# Patient Record
Sex: FEMALE | Race: WHITE | NOT HISPANIC OR LATINO | Employment: UNEMPLOYED | URBAN - METROPOLITAN AREA
[De-identification: names, ages, dates, MRNs, and addresses within clinical notes are randomized per-mention and may not be internally consistent; named-entity substitution may affect disease eponyms.]

---

## 2021-03-15 ENCOUNTER — CONVERSION ENCOUNTER (OUTPATIENT)
Dept: CARDIOLOGY | Facility: CLINIC | Age: 61
End: 2021-03-15

## 2021-03-15 ENCOUNTER — OFFICE VISIT CONVERTED (OUTPATIENT)
Dept: CARDIOLOGY | Facility: CLINIC | Age: 61
End: 2021-03-15
Attending: INTERNAL MEDICINE

## 2021-03-16 ENCOUNTER — HOSPITAL ENCOUNTER (OUTPATIENT)
Dept: LAB | Facility: HOSPITAL | Age: 61
Discharge: HOME | End: 2021-03-16
Attending: INTERNAL MEDICINE

## 2021-03-16 LAB
ALBUMIN SERPL-MCNC: 4.1 G/DL (ref 3.5–5)
ALBUMIN/GLOB SERPL: 1.6 {RATIO} (ref 1.4–2.6)
ALP SERPL-CCNC: 90 U/L (ref 43–160)
ALT SERPL-CCNC: 36 U/L (ref 10–40)
ANION GAP SERPL CALC-SCNC: 12 MMOL/L (ref 8–19)
AST SERPL-CCNC: 30 U/L (ref 15–50)
BASOPHILS # BLD AUTO: 0.04 10*3/UL (ref 0–0.2)
BASOPHILS NFR BLD AUTO: 0.8 % (ref 0–3)
BILIRUB SERPL-MCNC: 0.44 MG/DL (ref 0.2–1.3)
BNP SERPL-MCNC: 88 PG/ML (ref 0–900)
BUN SERPL-MCNC: 18 MG/DL (ref 5–25)
BUN/CREAT SERPL: 18 {RATIO} (ref 6–20)
CALCIUM SERPL-MCNC: 9.6 MG/DL (ref 8.7–10.4)
CHLORIDE SERPL-SCNC: 106 MMOL/L (ref 99–111)
CHOLEST SERPL-MCNC: 126 MG/DL (ref 107–200)
CHOLEST/HDLC SERPL: 1.7 {RATIO} (ref 3–6)
CONV ABS IMM GRAN: 0.01 10*3/UL (ref 0–0.2)
CONV CO2: 29 MMOL/L (ref 22–32)
CONV IMMATURE GRAN: 0.2 % (ref 0–1.8)
CONV TOTAL PROTEIN: 6.6 G/DL (ref 6.3–8.2)
CREAT UR-MCNC: 0.99 MG/DL (ref 0.5–0.9)
DEPRECATED RDW RBC AUTO: 47.2 FL (ref 36.4–46.3)
EOSINOPHIL # BLD AUTO: 0.08 10*3/UL (ref 0–0.7)
EOSINOPHIL # BLD AUTO: 1.6 % (ref 0–7)
ERYTHROCYTE [DISTWIDTH] IN BLOOD BY AUTOMATED COUNT: 13.1 % (ref 11.7–14.4)
GFR SERPLBLD BASED ON 1.73 SQ M-ARVRAT: >60 ML/MIN/{1.73_M2}
GLOBULIN UR ELPH-MCNC: 2.5 G/DL (ref 2–3.5)
GLUCOSE SERPL-MCNC: 85 MG/DL (ref 65–99)
HCT VFR BLD AUTO: 38.7 % (ref 37–47)
HDLC SERPL-MCNC: 73 MG/DL (ref 40–60)
HGB BLD-MCNC: 12.4 G/DL (ref 12–16)
LDLC SERPL CALC-MCNC: 42 MG/DL (ref 70–100)
LYMPHOCYTES # BLD AUTO: 1.79 10*3/UL (ref 1–5)
LYMPHOCYTES NFR BLD AUTO: 35.4 % (ref 20–45)
MAGNESIUM SERPL-MCNC: 2.47 MG/DL (ref 1.6–2.3)
MCH RBC QN AUTO: 31 PG (ref 27–31)
MCHC RBC AUTO-ENTMCNC: 32 G/DL (ref 33–37)
MCV RBC AUTO: 96.8 FL (ref 81–99)
MONOCYTES # BLD AUTO: 0.47 10*3/UL (ref 0.2–1.2)
MONOCYTES NFR BLD AUTO: 9.3 % (ref 3–10)
NEUTROPHILS # BLD AUTO: 2.67 10*3/UL (ref 2–8)
NEUTROPHILS NFR BLD AUTO: 52.7 % (ref 30–85)
NRBC CBCN: 0 % (ref 0–0.7)
OSMOLALITY SERPL CALC.SUM OF ELEC: 297 MOSM/KG (ref 273–304)
PLATELET # BLD AUTO: 222 10*3/UL (ref 130–400)
PMV BLD AUTO: 10.9 FL (ref 9.4–12.3)
POTASSIUM SERPL-SCNC: 4.1 MMOL/L (ref 3.5–5.3)
RBC # BLD AUTO: 4 10*6/UL (ref 4.2–5.4)
SODIUM SERPL-SCNC: 143 MMOL/L (ref 135–147)
T4 FREE SERPL-MCNC: 1.2 NG/DL (ref 0.9–1.8)
TRIGL SERPL-MCNC: 53 MG/DL (ref 40–150)
TSH SERPL-ACNC: 6.41 M[IU]/L (ref 0.27–4.2)
VLDLC SERPL-MCNC: 11 MG/DL (ref 5–37)
WBC # BLD AUTO: 5.06 10*3/UL (ref 4.8–10.8)

## 2021-03-26 ENCOUNTER — CONVERSION ENCOUNTER (OUTPATIENT)
Dept: CARDIOLOGY | Facility: CLINIC | Age: 61
End: 2021-03-26
Attending: INTERNAL MEDICINE

## 2021-05-10 NOTE — H&P
History and Physical      Patient Name: Sierra Ramirez   Patient ID: [id number]   Sex: Female   YOB: 1960    Primary Care Provider: Shaji Collins MD   Referring Provider: Shaji Collins MD    Visit Date: March 15, 2021    Provider: Dontrell Robbins MD   Location: [facility]   Location Address: [address] [place] [address], [facility]   [place], [place]  [id number]   Location Phone: [phone]          Chief Complaint     REASON FOR CONSULTATION: Cardiomyopathy, atrial fibrillation, establish cardiac care.       History Of Present Illness  Consult requested by: Shaji Collins MD   Sierra Ramirez is a 60 year old  female with dilated cardiomyopathy, status post ICD, history of paroxysmal atrial fibrillation, and ventricular tachycardia on amiodarone who is here to establish cardiac care. The patient moved from [place] to [place] 6 months back. She was diagnosed with congestive heart failure and cardiomyopathy around 10 years back. There were 2 cardiac catheterizations done since then. The last one was in 2019. She is on appropriate medical therapy since then. She had an ICD shock 2 years back, for which another cardiac cath was performed. She is on amiodarone since then. Other medications include Entresto, spironolactone, and torsemide. Today she denies having any chest pain, tightness, or pressure. She has shortness of breath on moderate exertion, which has been stable for the past several months. No recent ICD shocks. She currently has no pedal edema. There are no symptoms suggestive of orthopnea or PND. No dizziness.   PAST MEDICAL HISTORY: (1) Dilated cardiomyopathy with LV ejection fraction 15-20% per left ventriculogram in 2019. (2) Reported ventricular arrhythmias, on long-term amiodarone. (3) Paroxysmal atrial fibrillation, on anticoagulation with Xarelto. (4) Hypertension. (5) Hyperlipidemia. (6) Gastroesophageal reflux disease.   PSYCHOSOCIAL HISTORY: Denies tobacco use.  "Drinks alcohol rarely. No recreational drug usage.   FAMILY HISTORY: Positive for diabetes, hypertension, and heart disease. No family history of premature coronary artery disease.   CURRENT MEDICATIONS: Xarelto 20 mg daily; Torsemide 20 mg daily; Amiodarone  mg daily; Spironolactone 25 mg daily; Entresto 49/51 mg b.i.d.; Metoprolol Succinate ER 25 mg daily; Atorvastatin 40 mg daily; Esomeprazole 20 mg daily p.r.n.; Amitriptyline 25 mg p.r.n.; Nexium 20 mg p.r.n.   ALLERGIES: No known drug allergies.       Review of Systems  · Constitutional  o Admits  o : fatigue, good general health lately, recent weight changes   · Eyes  o Admits  o : double vision  · HENT  o Admits  o : hearing loss or ringing, swollen glands in neck  o Denies  o : chronic sinus problem  · Cardiovascular  o Admits  o : chest pain, palpitations (fast, fluttering, or skipping beats), swelling (feet, ankles, hands), shortness of breath while walking or lying flat  · Respiratory  o Admits  o : chronic or frequent cough  o Denies  o : asthma or wheezing, COPD  · Gastrointestinal  o Admits  o : ulcers  o Denies  o : nausea or vomiting  · Neurologic  o Admits  o : lightheaded or dizzy, headaches  o Denies  o : stroke  · Musculoskeletal  o Admits  o : joint pain, back pain  · Endocrine  o Admits  o : heat or cold intolerance, excessive thirst or urination  o Denies  o : thyroid disease, diabetes  · Heme-Lymph  o Admits  o : bleeding or bruising tendency  o Denies  o : anemia      Vitals  Date Time BP Position Site L\R Cuff Size HR RR TEMP (F) WT  HT  BMI kg/m2 BSA m2 O2 Sat FR L/min FiO2 HC       03/15/2021 02:08 PM 80/46 Sitting    60 - R   118lbs 0oz 5'  3\" 20.9 1.54             Physical Examination  · Constitutional  o Appearance  o : Awake, alert, in no acute distress.  · Head and Face  o HEENT  o : No pallor, anicteric. Eyes normal. Moist mucous membranes.  · Neck  o Inspection/Palpation  o : Supple.   o Jugular Veins  o : No JVD. No carotid " bruits.  · Respiratory  o Auscultation of Lungs  o : Clear to auscultation bilaterally. No crackles or wheezing.  · Cardiovascular  o Heart  o : S1, S2 is normally heard. No S3. No murmur, rubs, or gallops.  · Gastrointestinal  o Abdominal Examination  o : Soft, non-distended. No palpable hepatosplenomegaly. Bowel sounds heard in all four quadrants.  · Musculoskeletal  o General  o : Normal muscle tone and strength.  · Skin and Subcutaneous Tissue  o General Inspection  o : No skin rashes.  · Extremities  o Extremities  o : Trace pitting pedal edema bilaterally. Distal pulses present.   · EKG  o EKG  o : Performed in the office today.   o Indications  o : Cardiomyopathy and congestive heart failure.  o Results  o : Atrial paced rhythm, low voltage complex in extremity leads, prolonged QT interval, abnormal EKG.   o Comparison  o : When compared to previous EKG in 2019 done in [place], there are no significant changes.   · Device Interrogation  o Device Interrogation  o : ICD was interrogated in the office today. It is a [employer] device implanted on 05/16/2017. Battery longevity is 10-1/2 years. Atrial and ventricular lead parameters are appropriate. There were no events. We turned the rate response on. Normally functioning device. Programming changes as mentioned above.           Assessment     ASSESSMENT & PLAN:    1.  Dilated cardiomyopathy.  LV ejection fraction 15-20% per previous left ventriculogram and        echocardiogram.  No significant volume overload on physical examination.  Will check an echocardiogram        now to reassess LV function since the last one was more than 2 years back.  Continue Entresto, metoprolol,        spironolactone, and torsemide at the current dose.  Will check CBC, CMP, and proBNP since no lab        assessment has been done for some time.   2.  Hyperlipidemia.  Continue atorvastatin.  Will check lipid panel now and adjust the dose as needed.    3.  Status post ICD  placement.  Normally functioning device per today's interrogation.  Will start the patient on        remote home monitoring as well.    4.  Paroxysmal atrial fibrillation.  On Xarelto, to continue.  Will check CBC now.   5.  Reported ventricular tachycardia.  The patient is on amiodarone for a long time.  Per patient, it was taken        off at some point but had another ICD shock and it had to be put back on.  CMP and thyroid panel now.   6.  Will follow the echocardiogram and lab reports.  Otherwise, follow up in 3-4 months.     Dontrell Robbins MD   JV/rt                Electronically Signed by: Dang Singh-, Other -Author on March 23, 2021 05:25:17 PM  Electronically Co-signed by: Dontrell Robbins MD -Reviewer on March 24, 2021 09:26:47 AM

## 2021-05-10 NOTE — PROCEDURES
"   Procedure Note      Patient Name: Sierra Ramirez   Patient ID: [id number]   Sex: Female   YOB: 1960    Primary Care Provider: Shaji Collins MD   Referring Provider: Shaji Collins MD    Visit Date: March 26, 2021    Provider: Dontrell Robbins MD   Location: [facility]   Location Address: [address] [place] [address], [address]   [place]  [id number]   Location Phone: [phone]          FINAL REPORT   TRANSTHORACIC ECHOCARDIOGRAM REPORT    Diagnosis: Cardiomyopathy   Height: 5'3\" Weight: 118 B/P: 80/46 BSA: 1.6   Tech: BNS   MEASUREMENTS:  RVID (Diastole) : RVID. (NORMAL: 0.7 to 2.4 cm max)   LVID (Systole): 2.4 cm (Diastole): 4.2 cm . (NORMAL: 3.7 - 5.4 cm)   Posterior Wall Thickness (Diastole): 0.9 cm. (NORMAL: 0.8 - 1.1 cm)   Septal Thickness (Diastole): 0.7 cm. (NORMAL: 0.7 - 1.2 cm)   LAID (Systole): 3.5 cm. (NORMAL: 1.9 - 3.8 cm)   Aortic Root Diameter (Diastole): 2.6 cm. (NORMAL: 2.0 - 3.7 cm)   COMMENTS:  The patient underwent 2-D, M-Mode, and Doppler examination, including pulse-wave, continuous-wave, and color-flow analysis; the study is technically adequate.   FINDINGS:  MITRAL VALVE: Normal in appearance, opens well. No evidence of mitral valve prolapse. No mitral stenosis. Trace mitral regurgitation.   AORTIC VALVE: Normal trileaflet aortic valve, opens well. No evidence of aortic stenosis or regurgitation on Doppler examination.   TRICUSPID VALVE: Normal in appearance, opens well. Mild tricuspid regurgitation. Calculated pulmonary artery systolic pressure is 26-31 mmHg, which is within normal limits. Pacemaker lead is noted in the right atrium traversing the tricuspid valve into the right ventricle.   PULMONIC VALVE: Grossly normal.   AORTIC ROOT: Normal in size with adequate motion.   LEFT ATRIUM: Normal in size. No intracavitary masses or clots seen. LA volume index is 19 mL/m2.   LEFT VENTRICLE: The left ventricular chamber size is normal. The left ventricular wall " thickness is normal. Left ventricular systolic function is normal. Estimated LV ejection fraction is 55-60%. There are no regional wall motion abnormalities. There is grade 1 diastolic dysfunction, impaired relaxation of the left ventricle.   RIGHT VENTRICLE: Normal size and function.   RIGHT ATRIUM: Normal in size.   PERICARDIUM: No evidence of pericardial effusion.   INFERIOR VENA CAVA: Measures 1.1 cm in diameter and there is more than 50% collapse during inspiration.   DOPPLER: E/A ratio is 0.8.DT= 296 msec. IVRT is 63 msec. E/E' is 7.   Faxed: 04/05/2021      CONCLUSION:  1.  Normal left ventricular systolic function with estimated LV ejection fraction 55-60%.   2.  Grade 1 diastolic dysfunction of the left ventricle.   3.  Mild tricuspid regurgitation with normal calculated pulmonary artery systolic pressure.   4.  Pacemaker lead noted.     Dontrell Robbins MD   JV/rt                    Electronically Signed by: Dang Sinhg-, Other -Author on April 5, 2021 02:36:00 PM  Electronically Co-signed by: Dontrell Robbins MD -Reviewer on April 12, 2021 05:36:38 PM

## 2021-05-14 VITALS
SYSTOLIC BLOOD PRESSURE: 80 MMHG | HEART RATE: 60 BPM | HEIGHT: 63 IN | DIASTOLIC BLOOD PRESSURE: 46 MMHG | BODY MASS INDEX: 20.91 KG/M2 | WEIGHT: 118 LBS

## 2021-06-22 ENCOUNTER — TELEPHONE (OUTPATIENT)
Dept: CARDIOLOGY | Facility: CLINIC | Age: 61
End: 2021-06-22

## 2021-06-22 NOTE — TELEPHONE ENCOUNTER
I have ordered two monitors and she did not connect either one.  I have documented in old system also.  I called to ask her if she could connect the monitor so that we can get a reading and she stated that she moved and would need to find a new Cardiologist.  I un-enrolled  Her from the site.

## 2021-09-20 DIAGNOSIS — E78.5 HYPERLIPIDEMIA, UNSPECIFIED HYPERLIPIDEMIA TYPE: Primary | ICD-10-CM

## 2021-09-21 RX ORDER — ATORVASTATIN CALCIUM 40 MG/1
40 TABLET, FILM COATED ORAL NIGHTLY
Qty: 30 TABLET | Refills: 3 | Status: SHIPPED | OUTPATIENT
Start: 2021-09-21 | End: 2022-03-21

## 2022-01-03 ENCOUNTER — CLINICAL SUPPORT NO REQUIREMENTS (OUTPATIENT)
Dept: CARDIOLOGY | Facility: CLINIC | Age: 62
End: 2022-01-03

## 2022-01-03 ENCOUNTER — OFFICE VISIT (OUTPATIENT)
Dept: CARDIOLOGY | Facility: CLINIC | Age: 62
End: 2022-01-03

## 2022-01-03 VITALS
BODY MASS INDEX: 19.7 KG/M2 | HEART RATE: 63 BPM | HEIGHT: 63 IN | WEIGHT: 111.2 LBS | SYSTOLIC BLOOD PRESSURE: 91 MMHG | DIASTOLIC BLOOD PRESSURE: 54 MMHG

## 2022-01-03 DIAGNOSIS — Z95.810 ICD (IMPLANTABLE CARDIOVERTER-DEFIBRILLATOR), DUAL, IN SITU: ICD-10-CM

## 2022-01-03 DIAGNOSIS — I50.42 CHRONIC COMBINED SYSTOLIC AND DIASTOLIC CONGESTIVE HEART FAILURE: Primary | ICD-10-CM

## 2022-01-03 DIAGNOSIS — I48.0 PAROXYSMAL ATRIAL FIBRILLATION: ICD-10-CM

## 2022-01-03 DIAGNOSIS — E78.2 MIXED HYPERLIPIDEMIA: ICD-10-CM

## 2022-01-03 DIAGNOSIS — R07.9 CHEST PAIN, UNSPECIFIED TYPE: ICD-10-CM

## 2022-01-03 DIAGNOSIS — I49.9 VENTRICULAR ARRHYTHMIA: ICD-10-CM

## 2022-01-03 PROCEDURE — 99214 OFFICE O/P EST MOD 30 MIN: CPT | Performed by: INTERNAL MEDICINE

## 2022-01-03 PROCEDURE — 93283 PRGRMG EVAL IMPLANTABLE DFB: CPT | Performed by: INTERNAL MEDICINE

## 2022-01-03 RX ORDER — SACUBITRIL AND VALSARTAN 49; 51 MG/1; MG/1
TABLET, FILM COATED ORAL
COMMUNITY
Start: 2021-10-20 | End: 2022-01-11

## 2022-01-03 RX ORDER — SPIRONOLACTONE 25 MG/1
1 TABLET ORAL DAILY
COMMUNITY
Start: 2021-05-03 | End: 2022-01-03

## 2022-01-03 RX ORDER — ACETAMINOPHEN 325 MG/1
325 TABLET ORAL EVERY 6 HOURS PRN
COMMUNITY

## 2022-01-03 RX ORDER — TORSEMIDE 20 MG/1
1 TABLET ORAL DAILY
COMMUNITY
Start: 2021-05-03 | End: 2022-05-09

## 2022-01-03 RX ORDER — AMIODARONE HYDROCHLORIDE 200 MG/1
200 TABLET ORAL DAILY
COMMUNITY
Start: 2021-05-25 | End: 2022-05-09

## 2022-01-03 RX ORDER — VALACYCLOVIR HYDROCHLORIDE 1 G/1
TABLET, FILM COATED ORAL
COMMUNITY
Start: 2021-12-28 | End: 2022-07-26 | Stop reason: ALTCHOICE

## 2022-01-03 RX ORDER — METOPROLOL SUCCINATE 25 MG/1
1 TABLET, EXTENDED RELEASE ORAL DAILY
COMMUNITY
Start: 2021-05-03 | End: 2022-01-11

## 2022-01-03 NOTE — ASSESSMENT & PLAN NOTE
LVEF 10 to 15% per multiple echocardiograms done anyhow by in 2019.  However echo done in our office last year showed normal ejection fraction 55 to 60%.  This is likely recovery of function on medical management.  Clinically, she is not volume overloaded.  Her blood pressure is on the lower side and she reports dizziness.  We will discontinue spironolactone but continue metoprolol, Entresto and torsemide.  Repeat CMP now

## 2022-01-03 NOTE — ASSESSMENT & PLAN NOTE
ICD interrogated in the office today normally functioning device with battery longevity of 10-1/2 years.  There were no episodes.  There is 60% atrial pacing and 1% RV pacing.  Lead parameters are appropriate.  No programming changes were made.  We will continue to set up home remote monitoring.

## 2022-01-03 NOTE — ASSESSMENT & PLAN NOTE
She reports intermittent chest pain which are not exertional.  Cardiac catheter in 2019 showed near normal coronary arteries with no major lesions.  We discussed the option of stress test with the patient.  However she prefers not to do any testing now since symptoms are mild.  Recommend to continue current medicines and advised to call us back for any exertional symptoms.

## 2022-01-03 NOTE — ASSESSMENT & PLAN NOTE
Minimal nonobstructive coronary disease noted per previous Cardiac catheterization.  LDL is at goal.  Continue atorvastatin.

## 2022-01-03 NOTE — ASSESSMENT & PLAN NOTE
She is in sinus rhythm on physical examination.  Continue Xarelto for anticoagulation.  Continue metoprolol.  Labs done in March showed normal hemoglobin.

## 2022-01-03 NOTE — PROGRESS NOTES
Normal Dual Chamber ICD device interrogation.  Normal evaluation of device function and lead measurements.  No optimization was needed of parameters or maximization of device longevity, Remaining battery is 10 1/2 years.

## 2022-01-03 NOTE — PROGRESS NOTES
CARDIOLOGY FOLLOW-UP PROGRESS NOTE        Chief Complaint  Follow-up (6 month follow up), Chest Pain (Pt states off and on), and Dizziness    Subjective            Sierra Ramirez presents to [facility]  History of Present Illness    Ms. Ramirez is here for routine follow-up visit.  She was previously seen in March of this year when she came to establish cardiac care.  Today she reports having occasional chest discomfort while at rest mostly on the right side of the chest.  These are mild and last less than 1 minute.  She workout on treadmill for 15 minutes every day without having any chest pain.  Other major problem is dizziness while standing up.  Denies any shortness of breath, palpitations, pedal edema or symptoms history of orthopnea.  She is taking all the medicines as prescribed.       Past History:    (1) Dilated cardiomyopathy with LV ejection fraction 15-20% per left ventriculogram in 2019.  Echo done in March 2021 showed LV ejection fraction of 55 to 60% (2) Reported ventricular arrhythmias, on long-term amiodarone. (3) Paroxysmal atrial fibrillation, on anticoagulation with Xarelto. (4) Hypertension. (5) Hyperlipidemia. (6) Gastroesophageal reflux disease.     Medical History:  Past Medical History:   Diagnosis Date   • Atrial fibrillation (HCC)    • CHF (congestive heart failure) (HCC)    • Hyperlipidemia    • Sleep apnea        Surgical History: has a past surgical history that includes Insert / replace / remove pacemaker.     Family History: family history includes Heart disease in her brother, father, and mother.     Social History: reports that she has never smoked. She has never used smokeless tobacco. She reports that she does not drink alcohol and does not use drugs.    Allergies: Patient has no known allergies.    Current Outpatient Medications on File Prior to Visit   Medication Sig   • acetaminophen (TYLENOL) 325 MG tablet Take 325 mg by mouth Every 6 (Six) Hours  "As Needed for Mild Pain .   • amiodarone (PACERONE) 200 MG tablet Take 200 mg by mouth Daily.   • atorvastatin (Lipitor) 40 MG tablet Take 1 tablet by mouth Every Night.   • Entresto 49-51 MG tablet    • metoprolol succinate XL (TOPROL-XL) 25 MG 24 hr tablet Take 1 tablet by mouth Daily.   • rivaroxaban (Xarelto) 20 MG tablet TAKE 1 TABLET BY MOUTH EVERY DAY WITH EVENING MEAL   • torsemide (DEMADEX) 20 MG tablet Take 1 tablet by mouth Daily.   • valACYclovir (VALTREX) 1000 MG tablet TAKE 1 TABLET POI TWICE DAILY   • [DISCONTINUED] spironolactone (ALDACTONE) 25 MG tablet Take 1 tablet by mouth Daily.     No current facility-administered medications on file prior to visit.          Review of Systems   Respiratory: Negative for cough, shortness of breath and wheezing.    Cardiovascular: Positive for chest pain. Negative for palpitations and leg swelling.   Gastrointestinal: Negative for nausea and vomiting.   Neurological: Positive for dizziness. Negative for syncope.        Objective     BP 91/54 (BP Location: Right arm, Patient Position: Sitting, Cuff Size: Adult)   Pulse 63   Ht 160 cm (62.99\")   Wt 50.4 kg (111 lb 3.2 oz)   BMI 19.70 kg/m²       Physical Exam    General : Alert, awake, no acute distress  Neck : Supple, no carotid bruit, no jugular venous distention  CVS : Regular rate and rhythm, no murmur, rubs or gallops  Lungs: Clear to auscultation bilaterally, no crackles or rhonchi  Abdomen: Soft, nontender, bowel sounds heard in all 4 quadrants  Extremities: Warm, well-perfused, no pedal edema    Result Review :     The following data was reviewed by: Dontrell Robbins MD on 01/03/2022:    CMP    CMP 3/16/21   Glucose 85   BUN 18   Creatinine 0.99 (A)   Sodium 143   Potassium 4.1   Chloride 106   Calcium 9.6   Albumin 4.1   Total Bilirubin 0.44   Alkaline Phosphatase 90   AST (SGOT) 30   ALT (SGPT) 36   (A) Abnormal value            CBC    CBC 3/16/21   WBC 5.06   RBC 4.00 (A)   Hemoglobin 12.4 "   Hematocrit 38.7   MCV 96.8   MCH 31.0   MCHC 32.0 (A)   RDW 13.1   Platelets 222   (A) Abnormal value            TSH    TSH 3/16/21   TSH 6.410 (A)   (A) Abnormal value            Lipid Panel    Lipid Panel 3/16/21   Total Cholesterol 126   Triglycerides 53   HDL Cholesterol 73 (A)   VLDL Cholesterol 11   LDL Cholesterol  42 (A)   (A) Abnormal value       Comments are available for some flowsheets but are not being displayed.                Data reviewed: Cardiology studies      Echocardiogram done on March 26, 2021 showed    1.  Normal left ventricular systolic function with estimated LV ejection fraction 55-60%.   2.  Grade 1 diastolic dysfunction of the left ventricle.   3.  Mild tricuspid regurgitation with normal calculated pulmonary artery systolic pressure.   4.  Pacemaker lead noted.     ICD interrogated in the office today normally functioning device with battery longevity of 10-1/2 years.  There were no episodes.  There is 60% atrial pacing and 1% RV pacing.  Lead parameters are appropriate.  No programming changes were made.               Assessment and Plan        Diagnoses and all orders for this visit:    1. Chronic combined systolic and diastolic congestive heart failure (HCC) (Primary)  Assessment & Plan:  LVEF 10 to 15% per multiple echocardiograms done anyhow by in 2019.  However echo done in our office last year showed normal ejection fraction 55 to 60%.  This is likely recovery of function on medical management.  Clinically, she is not volume overloaded.  Her blood pressure is on the lower side and she reports dizziness.  We will discontinue spironolactone but continue metoprolol, Entresto and torsemide.  Repeat CMP now    Orders:  -     Cancel: Comprehensive Metabolic Panel; Future  -     Comprehensive Metabolic Panel; Future    2. Mixed hyperlipidemia  Assessment & Plan:  Minimal nonobstructive coronary disease noted per previous Cardiac catheterization.  LDL is at goal.  Continue  atorvastatin.    Orders:  -     Cancel: Comprehensive Metabolic Panel; Future  -     Cancel: Lipid Panel; Future  -     Comprehensive Metabolic Panel; Future  -     Lipid Panel; Future    3. ICD (implantable cardioverter-defibrillator), dual, in situ  Assessment & Plan:  ICD interrogated in the office today normally functioning device with battery longevity of 10-1/2 years.  There were no episodes.  There is 60% atrial pacing and 1% RV pacing.  Lead parameters are appropriate.  No programming changes were made.  We will continue to set up home remote monitoring.      4. Ventricular arrhythmia  Assessment & Plan:  Reported ventricular arrhythmias, ICD interrogation done today showed no significant events.  She is on amiodarone for long time.  Recent labs showed stable liver enzymes.  Will repeat CMP, TSH now.  TSH is mildly elevated, will defer to primary care provider.  Continue amiodarone 200 mg for now and if repeat labs for elevated liver enzymes, the dose can be decreased to every other day with intention to stop for the next 6 months.      5. Paroxysmal atrial fibrillation (HCC)  Assessment & Plan:  She is in sinus rhythm on physical examination.  Continue Xarelto for anticoagulation.  Continue metoprolol.  Labs done in March showed normal hemoglobin.      6. Chest pain, unspecified type  Assessment & Plan:  She reports intermittent chest pain which are not exertional.  Cardiac catheter in 2019 showed near normal coronary arteries with no major lesions.  We discussed the option of stress test with the patient.  However she prefers not to do any testing now since symptoms are mild.  Recommend to continue current medicines and advised to call us back for any exertional symptoms.              Follow Up     Return in about 6 months (around 7/3/2022) for Next scheduled follow up.    Patient was given instructions and counseling regarding her condition or for health maintenance advice. Please see specific information  pulled into the AVS if appropriate.

## 2022-01-03 NOTE — ASSESSMENT & PLAN NOTE
Reported ventricular arrhythmias, ICD interrogation done today showed no significant events.  She is on amiodarone for long time.  Recent labs showed stable liver enzymes.  Will repeat CMP, TSH now.  TSH is mildly elevated, will defer to primary care provider.  Continue amiodarone 200 mg for now and if repeat labs for elevated liver enzymes, the dose can be decreased to every other day with intention to stop for the next 6 months.

## 2022-01-06 ENCOUNTER — TELEPHONE (OUTPATIENT)
Dept: CARDIOLOGY | Facility: CLINIC | Age: 62
End: 2022-01-06

## 2022-01-12 RX ORDER — SACUBITRIL AND VALSARTAN 49; 51 MG/1; MG/1
TABLET, FILM COATED ORAL
Qty: 180 TABLET | Refills: 3 | Status: SHIPPED | OUTPATIENT
Start: 2022-01-12 | End: 2023-02-03 | Stop reason: SDUPTHER

## 2022-01-12 RX ORDER — METOPROLOL SUCCINATE 25 MG/1
TABLET, EXTENDED RELEASE ORAL
Qty: 90 TABLET | Refills: 3 | Status: SHIPPED | OUTPATIENT
Start: 2022-01-12 | End: 2023-02-03 | Stop reason: SDUPTHER

## 2022-01-24 ENCOUNTER — LAB (OUTPATIENT)
Dept: LAB | Facility: HOSPITAL | Age: 62
End: 2022-01-24

## 2022-01-24 DIAGNOSIS — I50.42 CHRONIC COMBINED SYSTOLIC AND DIASTOLIC CONGESTIVE HEART FAILURE: ICD-10-CM

## 2022-01-24 DIAGNOSIS — E78.2 MIXED HYPERLIPIDEMIA: ICD-10-CM

## 2022-01-24 LAB
ALBUMIN SERPL-MCNC: 3.9 G/DL (ref 3.5–5.2)
ALBUMIN/GLOB SERPL: 1.6 G/DL
ALP SERPL-CCNC: 78 U/L (ref 39–117)
ALT SERPL W P-5'-P-CCNC: 25 U/L (ref 1–33)
ANION GAP SERPL CALCULATED.3IONS-SCNC: 10 MMOL/L (ref 5–15)
AST SERPL-CCNC: 21 U/L (ref 1–32)
BILIRUB SERPL-MCNC: 0.4 MG/DL (ref 0–1.2)
BUN SERPL-MCNC: 18 MG/DL (ref 8–23)
BUN/CREAT SERPL: 22.8 (ref 7–25)
CALCIUM SPEC-SCNC: 9.5 MG/DL (ref 8.6–10.5)
CHLORIDE SERPL-SCNC: 103 MMOL/L (ref 98–107)
CHOLEST SERPL-MCNC: 157 MG/DL (ref 0–200)
CO2 SERPL-SCNC: 28 MMOL/L (ref 22–29)
CREAT SERPL-MCNC: 0.79 MG/DL (ref 0.57–1)
GFR SERPL CREATININE-BSD FRML MDRD: 74 ML/MIN/1.73
GFR SERPL CREATININE-BSD FRML MDRD: 89 ML/MIN/1.73
GLOBULIN UR ELPH-MCNC: 2.5 GM/DL
GLUCOSE SERPL-MCNC: 76 MG/DL (ref 65–99)
HDLC SERPL-MCNC: 84 MG/DL (ref 40–60)
LDLC SERPL CALC-MCNC: 62 MG/DL (ref 0–100)
LDLC/HDLC SERPL: 0.74 {RATIO}
POTASSIUM SERPL-SCNC: 3.8 MMOL/L (ref 3.5–5.2)
PROT SERPL-MCNC: 6.4 G/DL (ref 6–8.5)
SODIUM SERPL-SCNC: 141 MMOL/L (ref 136–145)
TRIGL SERPL-MCNC: 53 MG/DL (ref 0–150)
VLDLC SERPL-MCNC: 11 MG/DL (ref 5–40)

## 2022-01-24 PROCEDURE — 80053 COMPREHEN METABOLIC PANEL: CPT

## 2022-01-24 PROCEDURE — 80061 LIPID PANEL: CPT

## 2022-01-30 NOTE — PROGRESS NOTES
Labs reviewed.  Kidney functions, electrolytes, liver enzymes and cholesterol levels are all within normal limits.    Continue current medications without changes.    Recommend CMP, TSH 1 week before next visit.  Diagnosis is long-term medication therapy with amiodarone      Electronically signed by Dontrell Robbins MD, 01/30/22, 5:37 PM EST.

## 2022-02-01 ENCOUNTER — TELEPHONE (OUTPATIENT)
Dept: CARDIOLOGY | Facility: CLINIC | Age: 62
End: 2022-02-01

## 2022-02-01 NOTE — TELEPHONE ENCOUNTER
----- Message from Valerie Gayle RN sent at 2/1/2022  9:00 AM EST -----    ----- Message -----  From: Dontrell Robbins MD  Sent: 1/30/2022   5:37 PM EST  To: Valerie Gayle RN    Labs reviewed.  Kidney functions, electrolytes, liver enzymes and cholesterol levels are all within normal limits.    Continue current medications without changes.    Recommend CMP, TSH 1 week before next visit.  Diagnosis is long-term medication therapy with amiodarone      Electronically signed by Dontrell Robbins MD, 01/30/22, 5:37 PM EST.

## 2022-02-22 PROBLEM — E78.2 MIXED HYPERLIPIDEMIA: Chronic | Status: ACTIVE | Noted: 2022-01-03

## 2022-03-21 DIAGNOSIS — E78.5 HYPERLIPIDEMIA, UNSPECIFIED HYPERLIPIDEMIA TYPE: ICD-10-CM

## 2022-03-21 RX ORDER — ATORVASTATIN CALCIUM 40 MG/1
40 TABLET, FILM COATED ORAL NIGHTLY
Qty: 90 TABLET | Refills: 3 | Status: SHIPPED | OUTPATIENT
Start: 2022-03-21 | End: 2022-07-26 | Stop reason: SDUPTHER

## 2022-03-31 RX ORDER — RIVAROXABAN 20 MG/1
TABLET, FILM COATED ORAL
Qty: 90 TABLET | Refills: 3 | Status: SHIPPED | OUTPATIENT
Start: 2022-03-31

## 2022-05-09 RX ORDER — TORSEMIDE 20 MG/1
TABLET ORAL
Qty: 90 TABLET | Refills: 1 | Status: SHIPPED | OUTPATIENT
Start: 2022-05-09 | End: 2022-11-14

## 2022-05-09 RX ORDER — AMIODARONE HYDROCHLORIDE 200 MG/1
TABLET ORAL
Qty: 90 TABLET | Refills: 1 | Status: SHIPPED | OUTPATIENT
Start: 2022-05-09 | End: 2022-07-26 | Stop reason: SDUPTHER

## 2022-07-26 ENCOUNTER — OFFICE VISIT (OUTPATIENT)
Dept: CARDIOLOGY | Facility: CLINIC | Age: 62
End: 2022-07-26

## 2022-07-26 VITALS
HEIGHT: 62 IN | BODY MASS INDEX: 20.06 KG/M2 | SYSTOLIC BLOOD PRESSURE: 84 MMHG | WEIGHT: 109 LBS | HEART RATE: 70 BPM | DIASTOLIC BLOOD PRESSURE: 47 MMHG

## 2022-07-26 DIAGNOSIS — I49.9 VENTRICULAR ARRHYTHMIA: ICD-10-CM

## 2022-07-26 DIAGNOSIS — I48.0 PAROXYSMAL ATRIAL FIBRILLATION: ICD-10-CM

## 2022-07-26 DIAGNOSIS — I50.42 CHRONIC COMBINED SYSTOLIC AND DIASTOLIC CONGESTIVE HEART FAILURE: ICD-10-CM

## 2022-07-26 DIAGNOSIS — E78.2 MIXED HYPERLIPIDEMIA: Primary | Chronic | ICD-10-CM

## 2022-07-26 DIAGNOSIS — Z95.810 ICD (IMPLANTABLE CARDIOVERTER-DEFIBRILLATOR), DUAL, IN SITU: ICD-10-CM

## 2022-07-26 PROCEDURE — 99214 OFFICE O/P EST MOD 30 MIN: CPT | Performed by: INTERNAL MEDICINE

## 2022-07-26 RX ORDER — ATORVASTATIN CALCIUM 40 MG/1
40 TABLET, FILM COATED ORAL NIGHTLY
Qty: 90 TABLET | Refills: 3 | Status: SHIPPED | OUTPATIENT
Start: 2022-07-26

## 2022-07-26 RX ORDER — AMIODARONE HYDROCHLORIDE 200 MG/1
200 TABLET ORAL DAILY
Qty: 90 TABLET | Refills: 2 | Status: SHIPPED | OUTPATIENT
Start: 2022-07-26 | End: 2023-02-02

## 2022-07-26 RX ORDER — FINASTERIDE 1 MG/1
1 TABLET, FILM COATED ORAL DAILY
COMMUNITY
Start: 2022-05-27

## 2022-08-03 ENCOUNTER — LAB (OUTPATIENT)
Dept: LAB | Facility: HOSPITAL | Age: 62
End: 2022-08-03

## 2022-08-03 DIAGNOSIS — I48.0 PAROXYSMAL ATRIAL FIBRILLATION: ICD-10-CM

## 2022-08-03 DIAGNOSIS — I50.42 CHRONIC COMBINED SYSTOLIC AND DIASTOLIC CONGESTIVE HEART FAILURE: ICD-10-CM

## 2022-08-03 DIAGNOSIS — E78.2 MIXED HYPERLIPIDEMIA: Chronic | ICD-10-CM

## 2022-08-03 LAB
ALBUMIN SERPL-MCNC: 4.4 G/DL (ref 3.5–5.2)
ALBUMIN/GLOB SERPL: 2.1 G/DL
ALP SERPL-CCNC: 101 U/L (ref 39–117)
ALT SERPL W P-5'-P-CCNC: 19 U/L (ref 1–33)
ANION GAP SERPL CALCULATED.3IONS-SCNC: 9 MMOL/L (ref 5–15)
AST SERPL-CCNC: 21 U/L (ref 1–32)
BILIRUB SERPL-MCNC: 0.6 MG/DL (ref 0–1.2)
BUN SERPL-MCNC: 14 MG/DL (ref 8–23)
BUN/CREAT SERPL: 18.7 (ref 7–25)
CALCIUM SPEC-SCNC: 9.5 MG/DL (ref 8.6–10.5)
CHLORIDE SERPL-SCNC: 101 MMOL/L (ref 98–107)
CHOLEST SERPL-MCNC: 136 MG/DL (ref 0–200)
CO2 SERPL-SCNC: 29 MMOL/L (ref 22–29)
CREAT SERPL-MCNC: 0.75 MG/DL (ref 0.57–1)
DEPRECATED RDW RBC AUTO: 41.3 FL (ref 37–54)
EGFRCR SERPLBLD CKD-EPI 2021: 89 ML/MIN/1.73
ERYTHROCYTE [DISTWIDTH] IN BLOOD BY AUTOMATED COUNT: 12.4 % (ref 12.3–15.4)
GLOBULIN UR ELPH-MCNC: 2.1 GM/DL
GLUCOSE SERPL-MCNC: 81 MG/DL (ref 65–99)
HCT VFR BLD AUTO: 36.5 % (ref 34–46.6)
HDLC SERPL-MCNC: 83 MG/DL (ref 40–60)
HGB BLD-MCNC: 12.3 G/DL (ref 12–15.9)
LDLC SERPL CALC-MCNC: 42 MG/DL (ref 0–100)
LDLC/HDLC SERPL: 0.52 {RATIO}
MCH RBC QN AUTO: 30.6 PG (ref 26.6–33)
MCHC RBC AUTO-ENTMCNC: 33.7 G/DL (ref 31.5–35.7)
MCV RBC AUTO: 90.8 FL (ref 79–97)
PLATELET # BLD AUTO: 215 10*3/MM3 (ref 140–450)
PMV BLD AUTO: 9.9 FL (ref 6–12)
POTASSIUM SERPL-SCNC: 3.2 MMOL/L (ref 3.5–5.2)
PROT SERPL-MCNC: 6.5 G/DL (ref 6–8.5)
RBC # BLD AUTO: 4.02 10*6/MM3 (ref 3.77–5.28)
SODIUM SERPL-SCNC: 139 MMOL/L (ref 136–145)
TRIGL SERPL-MCNC: 48 MG/DL (ref 0–150)
TSH SERPL DL<=0.05 MIU/L-ACNC: 3.41 UIU/ML (ref 0.27–4.2)
VLDLC SERPL-MCNC: 11 MG/DL (ref 5–40)
WBC NRBC COR # BLD: 4.65 10*3/MM3 (ref 3.4–10.8)

## 2022-08-03 PROCEDURE — 85027 COMPLETE CBC AUTOMATED: CPT

## 2022-08-03 PROCEDURE — 80053 COMPREHEN METABOLIC PANEL: CPT

## 2022-08-03 PROCEDURE — 80061 LIPID PANEL: CPT

## 2022-08-03 PROCEDURE — 84443 ASSAY THYROID STIM HORMONE: CPT

## 2022-08-10 NOTE — ASSESSMENT & PLAN NOTE
LV function recovered on medical management.  Blood pressure borderline low today.  She has not had any labs done recently.  We will continue Entresto, metoprolol and current dose of torsemide.  Recommend to keep blood pressure log at home and return to us.  We will also do complete metabolic panel thyroid panel and CBC now since patient does not follow-up with any PCP currently.  Medication adjustments based on BP log and lab results.

## 2022-08-10 NOTE — PROGRESS NOTES
"  CARDIOLOGY FOLLOW-UP PROGRESS NOTE        Chief Complaint  Hyperlipidemia, Atrial Fibrillation, and Dizziness (\"Sometimes\")    Subjective            Sierra Ramirez presents to [facility] CARDIOLOGY  History of Present Illness      Ms Ramirez is here for routine 6-month follow-up visit.  She currently denies any new complaints.  Denies any major shortness of breath, or chest pain.  Denies palpitations.  She feels fatigued.  She occasionally feels dizziness, has not had any syncopal episodes.  She is taking all the medicines as prescribed.  She does not follow-up with any primary care provider at this time.    Past History:    (1) Dilated cardiomyopathy with LV ejection fraction 15-20% per left ventriculogram in 2019.  Echo done in March 2021 showed LV ejection fraction of 55 to 60% (2) Reported ventricular arrhythmias, on long-term amiodarone. (3) Paroxysmal atrial fibrillation, on anticoagulation with Xarelto. (4) Hypertension. (5) Hyperlipidemia. (6) Gastroesophageal reflux disease.     Medical History:  Past Medical History:   Diagnosis Date   • Atrial fibrillation (HCC)    • CHF (congestive heart failure) (HCC)    • Hyperlipidemia    • Mixed hyperlipidemia 1/3/2022   • Sleep apnea        Surgical History: has a past surgical history that includes Insert / replace / remove pacemaker.     Family History: family history includes Heart disease in her brother, father, and mother.     Social History: reports that she has never smoked. She has never used smokeless tobacco. She reports that she does not drink alcohol and does not use drugs.    Allergies: Patient has no known allergies.    Current Outpatient Medications on File Prior to Visit   Medication Sig   • acetaminophen (TYLENOL) 325 MG tablet Take 325 mg by mouth Every 6 (Six) Hours As Needed for Mild Pain .   • Entresto 49-51 MG tablet TAKE 1 TABLET BY MOUTH TWICE DAILY   • esomeprazole (nexIUM) 20 MG capsule Take 20 mg by mouth Every Morning " "Before Breakfast.   • finasteride (PROPECIA) 1 MG tablet Take 1 mg by mouth Daily.   • metoprolol succinate XL (TOPROL-XL) 25 MG 24 hr tablet TAKE 1 TABLET BY MOUTH EVERY DAY   • torsemide (DEMADEX) 20 MG tablet TAKE 1 TABLET BY MOUTH EVERY DAY AS DIRECTED   • Xarelto 20 MG tablet TAKE 1 TABLET BY MOUTH EVERY DAY WITH EVENING MEAL     No current facility-administered medications on file prior to visit.          Review of Systems   Respiratory: Negative for cough, shortness of breath and wheezing.    Cardiovascular: Negative for chest pain, palpitations and leg swelling.   Gastrointestinal: Negative for nausea and vomiting.   Neurological: Positive for dizziness. Negative for syncope.        Objective     BP (!) 84/47 (BP Location: Right arm, Patient Position: Sitting)   Pulse 70   Ht 157.5 cm (62\")   Wt 49.4 kg (109 lb)   BMI 19.94 kg/m²       Physical Exam    General : Alert, awake, no acute distress  Neck : Supple, no carotid bruit, no jugular venous distention  CVS : Regular rate and rhythm, no murmur, rubs or gallops  Lungs: Clear to auscultation bilaterally, no crackles or rhonchi  Abdomen: Soft, nontender, bowel sounds heard in all 4 quadrants  Extremities: Warm, well-perfused, no pedal edema    Result Review :     The following data was reviewed by: Dontrell Robbins MD on 07/26/2022:    CMP    CMP 1/24/22   Glucose 76   BUN 18   Creatinine 0.79   eGFR Non  Am 74   eGFR African Am 89   Sodium 141   Potassium 3.8   Chloride 103   Calcium 9.5   Albumin 3.90   Total Bilirubin 0.4   Alkaline Phosphatase 78   AST (SGOT) 21   ALT (SGPT) 25   Comments are available for some flowsheets but are not being displayed.                  Lipid Panel    Lipid Panel 1/24/22   Total Cholesterol 157   Triglycerides 53   HDL Cholesterol 84 (A)   VLDL Cholesterol 11   LDL Cholesterol  62   LDL/HDL Ratio 0.74             Data reviewed: Cardiology studies          Echocardiogram done on 3/26/2021 showed    1.  Normal " left ventricular systolic function with estimated LV ejection fraction 55-60%.   2.  Grade 1 diastolic dysfunction of the left ventricle.   3.  Mild tricuspid regurgitation with normal calculated pulmonary artery systolic pressure.   4.  Pacemaker lead noted.                Assessment and Plan        Diagnoses and all orders for this visit:    1. Mixed hyperlipidemia (Primary)  Assessment & Plan:  Continue atorvastatin, will check lipid panel now.    Orders:  -     atorvastatin (LIPITOR) 40 MG tablet; Take 1 tablet by mouth Every Night.  Dispense: 90 tablet; Refill: 3  -     Lipid Panel; Future  -     Comprehensive Metabolic Panel; Future    2. Ventricular arrhythmia  Assessment & Plan:  We will continue amiodarone.  We will check thyroid panel and CMP to monitor for long-term toxicity.  No events from ICD interrogation recently.    Orders:  -     amiodarone (PACERONE) 200 MG tablet; Take 1 tablet by mouth Daily.  Dispense: 90 tablet; Refill: 2    3. Chronic combined systolic and diastolic congestive heart failure (HCC)  Assessment & Plan:  LV function recovered on medical management.  Blood pressure borderline low today.  She has not had any labs done recently.  We will continue Entresto, metoprolol and current dose of torsemide.  Recommend to keep blood pressure log at home and return to us.  We will also do complete metabolic panel thyroid panel and CBC now since patient does not follow-up with any PCP currently.  Medication adjustments based on BP log and lab results.    Orders:  -     Comprehensive Metabolic Panel; Future    4. ICD (implantable cardioverter-defibrillator), dual, in situ  Assessment & Plan:  We will do ICD interrogation in office during next office visit.  Battery longevity 10 and half years per previous interrogation.      5. Paroxysmal atrial fibrillation (HCC)  Assessment & Plan:  She is in sinus rhythm, continue Xarelto for anticoagulation along with metoprolol.  We will check CBC  now.    Orders:  -     CBC (No Diff); Future  -     TSH; Future            Follow Up     Return in about 4 months (around 11/26/2022) for Next scheduled follow up, Device check.    Patient was given instructions and counseling regarding her condition or for health maintenance advice. Please see specific information pulled into the AVS if appropriate.

## 2022-08-10 NOTE — ASSESSMENT & PLAN NOTE
We will do ICD interrogation in office during next office visit.  Battery longevity 10 and half years per previous interrogation.

## 2022-08-10 NOTE — ASSESSMENT & PLAN NOTE
We will continue amiodarone.  We will check thyroid panel and CMP to monitor for long-term toxicity.  No events from ICD interrogation recently.

## 2022-08-10 NOTE — ASSESSMENT & PLAN NOTE
She is in sinus rhythm, continue Xarelto for anticoagulation along with metoprolol.  We will check CBC now.

## 2022-08-10 NOTE — PROGRESS NOTES
Recent labs showed normal cholesterol levels, kidney functions, sodium,liver enzymes, blood counts and thyroid panel.     Potassium levels are on the lower side, this is likely due to torsemide (fluid pill)    Recommend start taking potassium chloride 10 mEq p.o. daily for supplementation.  Please send the prescription if patient is agreeable.    We were also expecting a blood pressure log from the patient.  Is she doing it ?      Electronically signed by Dontrell Robbins MD, 08/10/22, 4:08 PM EDT.

## 2022-08-11 ENCOUNTER — TELEPHONE (OUTPATIENT)
Dept: CARDIOLOGY | Facility: CLINIC | Age: 62
End: 2022-08-11

## 2022-08-11 RX ORDER — POTASSIUM CHLORIDE 750 MG/1
10 TABLET, FILM COATED, EXTENDED RELEASE ORAL DAILY
Qty: 30 TABLET | Refills: 6 | Status: SHIPPED | OUTPATIENT
Start: 2022-08-11 | End: 2023-02-02

## 2022-08-11 NOTE — TELEPHONE ENCOUNTER
----- Message from Valerie Gayle RN sent at 8/11/2022  8:49 AM EDT -----    ----- Message -----  From: Dontrell Robbins MD  Sent: 8/10/2022   4:08 PM EDT  To: Valerie Gayle RN, Saniya Armas RN    Recent labs showed normal cholesterol levels, kidney functions, sodium,liver enzymes, blood counts and thyroid panel.     Potassium levels are on the lower side, this is likely due to torsemide (fluid pill)    Recommend start taking potassium chloride 10 mEq p.o. daily for supplementation.  Please send the prescription if patient is agreeable.    We were also expecting a blood pressure log from the patient.  Is she doing it ?      Electronically signed by Dontrell Robbins MD, 08/10/22, 4:08 PM EDT.

## 2022-08-12 ENCOUNTER — TELEPHONE (OUTPATIENT)
Dept: CARDIOLOGY | Facility: CLINIC | Age: 62
End: 2022-08-12

## 2022-08-17 NOTE — TELEPHONE ENCOUNTER
Blood pressure log reviewed.  Blood pressure remains borderline low and there are some readings with the significantly low blood pressure.    Recommend to decrease the dose of Toprol-XL to 25 mg half tablet daily.  Continue Entresto at the current dose.      Electronically signed by Dontrell Robbins MD, 08/17/22, 5:36 PM EDT.

## 2022-11-14 RX ORDER — TORSEMIDE 20 MG/1
TABLET ORAL
Qty: 90 TABLET | Refills: 0 | Status: SHIPPED | OUTPATIENT
Start: 2022-11-14 | End: 2023-02-02

## 2022-12-13 ENCOUNTER — OFFICE VISIT (OUTPATIENT)
Dept: CARDIOLOGY | Facility: CLINIC | Age: 62
End: 2022-12-13

## 2022-12-13 ENCOUNTER — CLINICAL SUPPORT NO REQUIREMENTS (OUTPATIENT)
Dept: CARDIOLOGY | Facility: CLINIC | Age: 62
End: 2022-12-13

## 2022-12-13 VITALS
WEIGHT: 108.6 LBS | SYSTOLIC BLOOD PRESSURE: 94 MMHG | HEIGHT: 62 IN | BODY MASS INDEX: 19.98 KG/M2 | HEART RATE: 78 BPM | DIASTOLIC BLOOD PRESSURE: 55 MMHG

## 2022-12-13 DIAGNOSIS — Z95.810 ICD (IMPLANTABLE CARDIOVERTER-DEFIBRILLATOR), DUAL, IN SITU: ICD-10-CM

## 2022-12-13 DIAGNOSIS — I50.42 CHRONIC COMBINED SYSTOLIC AND DIASTOLIC CONGESTIVE HEART FAILURE: ICD-10-CM

## 2022-12-13 DIAGNOSIS — Z95.810 ICD (IMPLANTABLE CARDIOVERTER-DEFIBRILLATOR), DUAL, IN SITU: Primary | ICD-10-CM

## 2022-12-13 DIAGNOSIS — E78.2 MIXED HYPERLIPIDEMIA: ICD-10-CM

## 2022-12-13 DIAGNOSIS — I49.9 VENTRICULAR ARRHYTHMIA: Primary | ICD-10-CM

## 2022-12-13 DIAGNOSIS — I48.0 PAROXYSMAL ATRIAL FIBRILLATION: ICD-10-CM

## 2022-12-13 PROCEDURE — 99214 OFFICE O/P EST MOD 30 MIN: CPT | Performed by: FAMILY MEDICINE

## 2022-12-13 PROCEDURE — 93283 PRGRMG EVAL IMPLANTABLE DFB: CPT | Performed by: INTERNAL MEDICINE

## 2022-12-13 NOTE — PROGRESS NOTES
Chief Complaint  Hyperlipidemia, Follow-up, Congestive Heart Failure, and Atrial Fibrillation    Subjective        History of Present Illness  Sierra Ramirez presents to [facility] CARDIOLOGY   Ms. Ramirez is a 62-year-old female patient presenting today for routine follow-up.  Since she was previously seen in July, she submitted blood pressure log with multiple low readings.  At that time her Toprol was decreased to 12.5 mg daily.  She reports she is still having some intermittent low readings at home, blood pressure in office today is 94/55.  She does complain of having persistent fatigue, however she denies any chest pains, palpitations, shortness of breath, dizziness or syncopal episodes.    Past History:     (1) Dilated cardiomyopathy with LV ejection fraction 15-20% per left ventriculogram in 2019.  Echo done in March 2021 showed LV ejection fraction of 55 to 60% (2) Reported ventricular arrhythmias, on long-term amiodarone. (3) Paroxysmal atrial fibrillation, on anticoagulation with Xarelto. (4) Hypertension. (5) Hyperlipidemia. (6) Gastroesophageal reflux disease.        PM  Past Medical History:   Diagnosis Date   • Atrial fibrillation (HCC)    • CHF (congestive heart failure) (HCC)    • Hyperlipidemia    • Mixed hyperlipidemia 1/3/2022   • Sleep apnea          ALLERGY  No Known Allergies       SURGICALHX  Past Surgical History:   Procedure Laterality Date   • INSERT / REPLACE / REMOVE PACEMAKER          SOC  Social History     Socioeconomic History   • Marital status: Single   Tobacco Use   • Smoking status: Never   • Smokeless tobacco: Never   Vaping Use   • Vaping Use: Never used   Substance and Sexual Activity   • Alcohol use: Never   • Drug use: Never   • Sexual activity: Defer         FAMHX  Family History   Problem Relation Age of Onset   • Heart disease Mother    • Heart disease Father    • Heart disease Brother           MEDSIGONLY  Current Outpatient Medications on File Prior to  "Visit   Medication Sig   • acetaminophen (TYLENOL) 325 MG tablet Take 325 mg by mouth Every 6 (Six) Hours As Needed for Mild Pain .   • amiodarone (PACERONE) 200 MG tablet Take 1 tablet by mouth Daily.   • atorvastatin (LIPITOR) 40 MG tablet Take 1 tablet by mouth Every Night.   • Entresto 49-51 MG tablet TAKE 1 TABLET BY MOUTH TWICE DAILY   • esomeprazole (nexIUM) 20 MG capsule Take 20 mg by mouth Every Morning Before Breakfast.   • finasteride (PROPECIA) 1 MG tablet Take 1 mg by mouth Daily.   • metoprolol succinate XL (TOPROL-XL) 25 MG 24 hr tablet TAKE 1 TABLET BY MOUTH EVERY DAY   • potassium chloride 10 MEQ CR tablet Take 1 tablet by mouth Daily.   • torsemide (DEMADEX) 20 MG tablet TAKE 1 TABLET BY MOUTH EVERY DAY AS DIRECTED   • Xarelto 20 MG tablet TAKE 1 TABLET BY MOUTH EVERY DAY WITH EVENING MEAL     No current facility-administered medications on file prior to visit.       REVIEW OF SYSTEMS  Review of Systems   Constitutional: Positive for fatigue. Negative for activity change and chills.   Respiratory: Negative for cough, chest tightness and shortness of breath.    Cardiovascular: Negative for chest pain, palpitations and leg swelling.   Gastrointestinal: Negative for nausea and vomiting.   Skin: Negative for rash.   Neurological: Negative for dizziness, syncope and light-headedness.   Hematological: Does not bruise/bleed easily.        Objective   Vitals:    12/13/22 1119   BP: 94/55   Pulse: 78   Weight: 49.3 kg (108 lb 9.6 oz)   Height: 157.5 cm (62\")         Physical Exam  Constitutional:       General: She is awake. She is not in acute distress.     Appearance: Normal appearance.   Eyes:      General: No scleral icterus.     Conjunctiva/sclera: Conjunctivae normal.   Neck:      Vascular: No JVD.   Cardiovascular:      Rate and Rhythm: Normal rate and regular rhythm.      Heart sounds: Normal heart sounds, S1 normal and S2 normal. No murmur heard.    No friction rub. No gallop.   Pulmonary:      " Effort: Pulmonary effort is normal.      Breath sounds: Normal breath sounds. No wheezing, rhonchi or rales.   Abdominal:      General: There is no distension.      Palpations: Abdomen is soft.   Musculoskeletal:         General: Normal range of motion.      Right lower leg: No edema.      Left lower leg: No edema.   Skin:     General: Skin is warm and dry.   Neurological:      Mental Status: She is alert and oriented to person, place, and time.         Result Review     The following data was reviewed by HERIBERTO Long on 12/13/22.    CMP    CMP 1/24/22 8/3/22   Glucose 76 81   BUN 18 14   Creatinine 0.79 0.75   eGFR Non  Am 74    eGFR African Am 89    Sodium 141 139   Potassium 3.8 3.2 (A)   Chloride 103 101   Calcium 9.5 9.5   Albumin 3.90 4.40   Total Bilirubin 0.4 0.6   Alkaline Phosphatase 78 101   AST (SGOT) 21 21   ALT (SGPT) 25 19   (A) Abnormal value       Comments are available for some flowsheets but are not being displayed.           CBC w/diff    CBC w/Diff 8/3/22   WBC 4.65   RBC 4.02   Hemoglobin 12.3   Hematocrit 36.5   MCV 90.8   MCH 30.6   MCHC 33.7   RDW 12.4   Platelets 215            Lab Results   Component Value Date    TSH 3.410 08/03/2022      Lab Results   Component Value Date    FREET4 1.2 03/16/2021        Magnesium   Date Value Ref Range Status   03/16/2021 2.47 (H) 1.60 - 2.30 mg/dL Final             Lipid Panel    Lipid Panel 1/24/22 8/3/22   Total Cholesterol 157 136   Triglycerides 53 48   HDL Cholesterol 84 (A) 83 (A)   VLDL Cholesterol 11 11   LDL Cholesterol  62 42   LDL/HDL Ratio 0.74 0.52   (A) Abnormal value            Echocardiogram March 26, 2021    CONCLUSION:  1.  Normal left ventricular systolic function with estimated LV ejection fraction 55-60%.   2.  Grade 1 diastolic dysfunction of the left ventricle.   3.  Mild tricuspid regurgitation with normal calculated pulmonary artery systolic pressure.   4.  Pacemaker lead noted.             Assessment and Plan    Diagnoses and all orders for this visit:    1. Ventricular arrhythmia (Primary)  Assessment & Plan:  No recent events noted on interrogation of ICD.  We will continue long-term management with amiodarone, reports in the past when previous cardiologist attempted to wean her off she began receiving ICD shocks.  Due to long-term management with amiodarone, we will recheck her CMP, magnesium and TSH.    Orders:  -     Comprehensive Metabolic Panel; Future  -     Magnesium; Future  -     TSH; Future    2. Chronic combined systolic and diastolic congestive heart failure (HCC)  Assessment & Plan:  She appears euvolemic.   LV function recovered on medical management, with EF of 55 to 60%.  For now she can continue her Entresto, metoprolol, and torsemide.  Her blood pressure continues to trend to the lower side, I have requested her to submit a 1 to 2-week BP journal.  Likely will need to make medication adjustment, we will wait for her BP log to do so.      Orders:  -     Comprehensive Metabolic Panel; Future  -     Magnesium; Future  -     TSH; Future    3. ICD (implantable cardioverter-defibrillator), dual, in situ  Assessment & Plan:  Normal device interrogation without issue today.  We will repeat interrogation at her next follow-up.      4. Paroxysmal atrial fibrillation (HCC)  Assessment & Plan:  Currently on this rhythm, continue metoprolol, as well as Xarelto for anticoagulation.        5. Mixed hyperlipidemia  Assessment & Plan:  Most recent LDL 42, continue current dosing of atorvastatin.        [id number] Score: 2         Follow Up   Return in about 6 months (around 6/13/2023) for with Dr. Robbins, Device check.   Follow-up appointment was scheduled, however patient believes by then [place].  I did encourage her to either obtain medical records prior to moving, or contact information so that her next provider can review her previous records for continuity of care.      Patient was  given instructions and counseling regarding her condition or for health maintenance advice. Please see specific information pulled into the AVS if appropriate.     Kaela Schaeffer, APRN  12/13/22  11:16 EST    Dictated Utilizing Dragon Dictation

## 2022-12-14 NOTE — ASSESSMENT & PLAN NOTE
No recent events noted on interrogation of ICD.  We will continue long-term management with amiodarone, reports in the past when previous cardiologist attempted to wean her off she began receiving ICD shocks.  Due to long-term management with amiodarone, we will recheck her CMP, magnesium and TSH.

## 2022-12-14 NOTE — ASSESSMENT & PLAN NOTE
She appears euvolemic.   LV function recovered on medical management, with EF of 55 to 60%.  For now she can continue her Entresto, metoprolol, and torsemide.  Her blood pressure continues to trend to the lower side, I have requested her to submit a 1 to 2-week BP journal.  Likely will need to make medication adjustment, we will wait for her BP log to do so.

## 2022-12-14 NOTE — ASSESSMENT & PLAN NOTE
Normal device interrogation without issue today.  We will repeat interrogation at her next follow-up.

## 2022-12-14 NOTE — PROGRESS NOTES
Normal Dual Chamber ICD Device Interrogation and Device Testing.  Normal evaluation of device function and lead measurements.  No optimization was needed of parameters or maximization of device longevity.  Patient has not received her home remote monitor and adapter.  No episodes and no alerts Remaining battery is 9 1/2 years.

## 2022-12-28 ENCOUNTER — LAB (OUTPATIENT)
Dept: LAB | Facility: HOSPITAL | Age: 62
End: 2022-12-28
Payer: MEDICARE

## 2022-12-28 DIAGNOSIS — I50.42 CHRONIC COMBINED SYSTOLIC AND DIASTOLIC CONGESTIVE HEART FAILURE: ICD-10-CM

## 2022-12-28 DIAGNOSIS — I49.9 VENTRICULAR ARRHYTHMIA: ICD-10-CM

## 2022-12-28 LAB
ALBUMIN SERPL-MCNC: 4.4 G/DL (ref 3.5–5.2)
ALBUMIN/GLOB SERPL: 2.3 G/DL
ALP SERPL-CCNC: 108 U/L (ref 39–117)
ALT SERPL W P-5'-P-CCNC: 15 U/L (ref 1–33)
ANION GAP SERPL CALCULATED.3IONS-SCNC: 8.5 MMOL/L (ref 5–15)
AST SERPL-CCNC: 19 U/L (ref 1–32)
BILIRUB SERPL-MCNC: 0.3 MG/DL (ref 0–1.2)
BUN SERPL-MCNC: 14 MG/DL (ref 8–23)
BUN/CREAT SERPL: 13.9 (ref 7–25)
CALCIUM SPEC-SCNC: 9.5 MG/DL (ref 8.6–10.5)
CHLORIDE SERPL-SCNC: 102 MMOL/L (ref 98–107)
CO2 SERPL-SCNC: 29.5 MMOL/L (ref 22–29)
CREAT SERPL-MCNC: 1.01 MG/DL (ref 0.57–1)
EGFRCR SERPLBLD CKD-EPI 2021: 62.3 ML/MIN/1.73
GLOBULIN UR ELPH-MCNC: 1.9 GM/DL
GLUCOSE SERPL-MCNC: 113 MG/DL (ref 65–99)
MAGNESIUM SERPL-MCNC: 2.2 MG/DL (ref 1.6–2.4)
POTASSIUM SERPL-SCNC: 3.9 MMOL/L (ref 3.5–5.2)
PROT SERPL-MCNC: 6.3 G/DL (ref 6–8.5)
SODIUM SERPL-SCNC: 140 MMOL/L (ref 136–145)
TSH SERPL DL<=0.05 MIU/L-ACNC: 4.22 UIU/ML (ref 0.27–4.2)

## 2022-12-28 PROCEDURE — 36415 COLL VENOUS BLD VENIPUNCTURE: CPT

## 2022-12-28 PROCEDURE — 83735 ASSAY OF MAGNESIUM: CPT

## 2022-12-28 PROCEDURE — 84443 ASSAY THYROID STIM HORMONE: CPT

## 2022-12-28 PROCEDURE — 80053 COMPREHEN METABOLIC PANEL: CPT

## 2023-01-09 ENCOUNTER — TELEPHONE (OUTPATIENT)
Dept: CARDIOLOGY | Facility: CLINIC | Age: 63
End: 2023-01-09
Payer: MEDICARE

## 2023-01-10 ENCOUNTER — TELEPHONE (OUTPATIENT)
Dept: CARDIOLOGY | Facility: CLINIC | Age: 63
End: 2023-01-10
Payer: MEDICARE

## 2023-01-10 NOTE — TELEPHONE ENCOUNTER
----- Message from HERIBERTO Long sent at 1/10/2023  8:25 AM EST -----  Lab results show normal electrolytes, liver enzymes, and renal function.  She should continue her current medications.

## 2023-01-10 NOTE — TELEPHONE ENCOUNTER
Although she does have some lower readings, overall her blood pressure seem to be staying on the low end of normal.  For now we will continue her current medications, and she should let us know if she has further episodes of low blood pressure or symptoms.

## 2023-02-02 DIAGNOSIS — I49.9 VENTRICULAR ARRHYTHMIA: ICD-10-CM

## 2023-02-02 RX ORDER — POTASSIUM CHLORIDE 750 MG/1
10 TABLET, FILM COATED, EXTENDED RELEASE ORAL DAILY
Qty: 30 TABLET | Refills: 6 | OUTPATIENT
Start: 2023-02-02

## 2023-02-02 RX ORDER — TORSEMIDE 20 MG/1
20 TABLET ORAL DAILY
Qty: 90 TABLET | Refills: 1 | Status: SHIPPED | OUTPATIENT
Start: 2023-02-02

## 2023-02-02 RX ORDER — POTASSIUM CHLORIDE 750 MG/1
10 TABLET, FILM COATED, EXTENDED RELEASE ORAL DAILY
Qty: 90 TABLET | Refills: 1 | Status: SHIPPED | OUTPATIENT
Start: 2023-02-02

## 2023-02-02 RX ORDER — AMIODARONE HYDROCHLORIDE 200 MG/1
200 TABLET ORAL DAILY
Qty: 90 TABLET | Refills: 1 | Status: SHIPPED | OUTPATIENT
Start: 2023-02-02

## 2023-02-03 RX ORDER — METOPROLOL SUCCINATE 25 MG/1
25 TABLET, EXTENDED RELEASE ORAL DAILY
Qty: 90 TABLET | Refills: 1 | Status: SHIPPED | OUTPATIENT
Start: 2023-02-03

## 2023-02-03 RX ORDER — SACUBITRIL AND VALSARTAN 49; 51 MG/1; MG/1
1 TABLET, FILM COATED ORAL 2 TIMES DAILY
Qty: 180 TABLET | Refills: 1 | Status: SHIPPED | OUTPATIENT
Start: 2023-02-03

## 2023-05-08 RX ORDER — RIVAROXABAN 20 MG/1
TABLET, FILM COATED ORAL
Qty: 90 TABLET | Refills: 3 | Status: SHIPPED | OUTPATIENT
Start: 2023-05-08

## 2023-05-24 ENCOUNTER — TELEPHONE (OUTPATIENT)
Dept: CARDIOLOGY | Facility: CLINIC | Age: 63
End: 2023-05-24
Payer: MEDICARE

## 2023-05-24 NOTE — TELEPHONE ENCOUNTER
Patient called stating she has been sick over he past week- to week and half- was unable to take any medications. Patient also stated she has not been taking her xarelto for last six months due to having multiple cuts and bleeding.     [place]- encouraged patient to start keeping a bp log, continue to take all of her prescribed medications as directed- if patient has any more issues to please call the office prior to stop taking medications. Bring bp log with patient to next f/u appointment. Patient verbalized understanding and notified the office she will be moving next month, will find a new provider in [place]. RN highly encouraged patient to find a cardiologist right away as she has a device that we will continue to monitor until she has it transferred. Patient verbalized understanding and appreciation.

## 2023-05-30 RX ORDER — POTASSIUM CHLORIDE 750 MG/1
10 TABLET, FILM COATED, EXTENDED RELEASE ORAL DAILY
Qty: 90 TABLET | Refills: 1 | Status: SHIPPED | OUTPATIENT
Start: 2023-05-30

## 2023-08-04 DIAGNOSIS — I49.9 VENTRICULAR ARRHYTHMIA: ICD-10-CM

## 2023-08-04 RX ORDER — AMIODARONE HYDROCHLORIDE 200 MG/1
200 TABLET ORAL DAILY
Qty: 90 TABLET | Refills: 1 | OUTPATIENT
Start: 2023-08-04

## 2023-08-04 RX ORDER — METOPROLOL SUCCINATE 25 MG/1
25 TABLET, EXTENDED RELEASE ORAL DAILY
Qty: 90 TABLET | Refills: 1 | OUTPATIENT
Start: 2023-08-04

## 2023-08-04 RX ORDER — TORSEMIDE 20 MG/1
20 TABLET ORAL DAILY
Qty: 90 TABLET | Refills: 1 | OUTPATIENT
Start: 2023-08-04

## 2023-08-04 RX ORDER — SACUBITRIL AND VALSARTAN 49; 51 MG/1; MG/1
TABLET, FILM COATED ORAL
Qty: 180 TABLET | Refills: 1 | OUTPATIENT
Start: 2023-08-04

## 2023-08-15 DIAGNOSIS — I49.9 VENTRICULAR ARRHYTHMIA: ICD-10-CM

## 2023-08-16 RX ORDER — TORSEMIDE 20 MG/1
20 TABLET ORAL DAILY
Qty: 90 TABLET | Refills: 1 | Status: SHIPPED | OUTPATIENT
Start: 2023-08-16

## 2023-08-16 RX ORDER — AMIODARONE HYDROCHLORIDE 200 MG/1
200 TABLET ORAL DAILY
Qty: 90 TABLET | Refills: 1 | Status: SHIPPED | OUTPATIENT
Start: 2023-08-16

## 2023-08-24 DIAGNOSIS — E78.2 MIXED HYPERLIPIDEMIA: Chronic | ICD-10-CM

## 2023-08-24 RX ORDER — ATORVASTATIN CALCIUM 40 MG/1
40 TABLET, FILM COATED ORAL NIGHTLY
Qty: 90 TABLET | Refills: 3 | OUTPATIENT
Start: 2023-08-24

## 2023-08-24 RX ORDER — ATORVASTATIN CALCIUM 40 MG/1
40 TABLET, FILM COATED ORAL NIGHTLY
Qty: 30 TABLET | Refills: 0 | Status: SHIPPED | OUTPATIENT
Start: 2023-08-24

## 2023-08-24 RX ORDER — ATORVASTATIN CALCIUM 40 MG/1
TABLET, FILM COATED ORAL
Qty: 90 TABLET | OUTPATIENT
Start: 2023-08-24

## 2023-08-24 NOTE — TELEPHONE ENCOUNTER
Caller: Sierra Ramirez    Relationship: Self    Best call back number: [phone]    Requested Prescriptions:   Requested Prescriptions     Pending Prescriptions Disp Refills    atorvastatin (LIPITOR) 40 MG tablet 90 tablet 3     Sig: Take 1 tablet by mouth Every Night.        Pharmacy where request should be sent: [id number] - [facility] [address] [facility] [address] [facility] [address] [phone] [facility] [phone] FX     Last office visit with prescribing clinician: 7/26/2022   Last telemedicine visit with prescribing clinician: Visit date not found   Next office visit with prescribing clinician: Visit date not found     Additional details provided by patient: PATIENT HAS ABOUT 8 DAYS LEFT OF MEDICATION AND THE PHARMACY SAID THAT THEY NEED APPROVAL FROM DR MARINO TO REFILL PRESCRIPTION. PATIENT IS CURRENTLY IN [place] ON VACATION AND WILL CALL THE LOCAL [facility] TO PULL THE PRESCRIPTION FROM [place]. PLEASE ADVISE ON REFILL OF MEDICATION, THANK YOU    Does the patient have less than a 3 day supply:  [] Yes  [x] No    Would you like a call back once the refill request has been completed: [] Yes [x] No    If the office needs to give you a call back, can they leave a voicemail: [] Yes [x] No    Mercedes Bhatti Rep   08/24/23 12:26 EDT

## 2023-10-19 ENCOUNTER — TELEPHONE (OUTPATIENT)
Dept: CARDIOLOGY | Facility: CLINIC | Age: 63
End: 2023-10-19
Payer: MEDICARE

## 2023-10-19 NOTE — TELEPHONE ENCOUNTER
The [facility] received a fax that requires your attention. The document has been indexed to the patient’s chart for your review.      Reason for sending: EXTERNAL MEDICAL RECORD NOTIFICATION     Documents Description: MEDS-ATORVASTATIN REFILL-10.19.23    Name of Sender: JOSE     Date Indexed: 10.19.23

## 2024-01-31 RX ORDER — POTASSIUM CHLORIDE 750 MG/1
10 TABLET, FILM COATED, EXTENDED RELEASE ORAL DAILY
Qty: 90 TABLET | Refills: 1 | OUTPATIENT
Start: 2024-01-31

## 2024-09-25 ENCOUNTER — APPOINTMENT (OUTPATIENT)
Dept: URBAN - METROPOLITAN AREA CLINIC 278 | Age: 64
Setting detail: DERMATOLOGY
End: 2024-09-25

## 2024-09-25 DIAGNOSIS — L64.8 OTHER ANDROGENIC ALOPECIA: ICD-10-CM

## 2024-09-25 DIAGNOSIS — L20.89 OTHER ATOPIC DERMATITIS: ICD-10-CM

## 2024-09-25 PROCEDURE — OTHER PRESCRIPTION MEDICATION MANAGEMENT: OTHER

## 2024-09-25 PROCEDURE — OTHER PRESCRIPTION: OTHER

## 2024-09-25 PROCEDURE — OTHER MIPS QUALITY: OTHER

## 2024-09-25 PROCEDURE — 99204 OFFICE O/P NEW MOD 45 MIN: CPT

## 2024-09-25 PROCEDURE — OTHER COUNSELING: OTHER

## 2024-09-25 RX ORDER — TRIAMCINOLONE ACETONIDE 1 MG/G
OINTMENT TOPICAL AS DIRECTED
Qty: 80 | Refills: 1 | Status: ERX | COMMUNITY
Start: 2024-09-25

## 2024-09-25 RX ORDER — FINASTERIDE 1 MG/1
TABLET, FILM COATED ORAL
Qty: 30 | Refills: 5 | Status: ERX | COMMUNITY
Start: 2024-09-25

## 2024-09-25 ASSESSMENT — LOCATION ZONE DERM: LOCATION ZONE: SCALP

## 2024-09-25 ASSESSMENT — LOCATION DETAILED DESCRIPTION DERM: LOCATION DETAILED: LEFT SUPERIOR PARIETAL SCALP

## 2024-09-25 ASSESSMENT — LOCATION SIMPLE DESCRIPTION DERM: LOCATION SIMPLE: SCALP

## 2024-09-25 NOTE — PROCEDURE: PRESCRIPTION MEDICATION MANAGEMENT
Render In Strict Bullet Format?: No
Detail Level: Zone
Initiate Treatment: Finasteride 1 mg tablet QD\\nMinoxidil otc topical qd

## 2024-09-25 NOTE — PROCEDURE: COUNSELING
Detail Level: Detailed
Cleanser Recommendations: Cerave, Cetaphil, Vanicream, LaRoche Posay Lipikar or Dove
Detail Level: Generalized
Antihistamine Recommendations: Allegra or Zyrtec for morning, Benadryl or rx hydroxyzine for night

## 2025-04-01 ENCOUNTER — RX ONLY (RX ONLY)
Age: 65
End: 2025-04-01

## 2025-04-01 RX ORDER — FINASTERIDE 1 MG/1
1 TABLET, FILM COATED ORAL QD
Qty: 30 | Refills: 5 | Status: ERX